# Patient Record
Sex: FEMALE | Race: WHITE | NOT HISPANIC OR LATINO | Employment: FULL TIME | URBAN - METROPOLITAN AREA
[De-identification: names, ages, dates, MRNs, and addresses within clinical notes are randomized per-mention and may not be internally consistent; named-entity substitution may affect disease eponyms.]

---
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
## 2024-04-20 ENCOUNTER — HOSPITAL ENCOUNTER (EMERGENCY)
Facility: HOSPITAL | Age: 19
Discharge: HOME | End: 2024-04-20
Attending: EMERGENCY MEDICINE
Payer: COMMERCIAL

## 2024-04-20 VITALS
DIASTOLIC BLOOD PRESSURE: 77 MMHG | TEMPERATURE: 99 F | HEART RATE: 79 BPM | SYSTOLIC BLOOD PRESSURE: 108 MMHG | WEIGHT: 119 LBS | OXYGEN SATURATION: 99 % | BODY MASS INDEX: 20.43 KG/M2 | RESPIRATION RATE: 18 BRPM

## 2024-04-20 DIAGNOSIS — F10.929 ALCOHOLIC INTOXICATION WITH COMPLICATION: Primary | ICD-10-CM

## 2024-04-20 LAB
B-HCG UR QL: NEGATIVE
CTP QC/QA: YES
POCT GLUCOSE: 197 MG/DL (ref 70–110)

## 2024-04-20 PROCEDURE — 82962 GLUCOSE BLOOD TEST: CPT

## 2024-04-20 PROCEDURE — 99283 EMERGENCY DEPT VISIT LOW MDM: CPT

## 2024-04-20 PROCEDURE — 81025 URINE PREGNANCY TEST: CPT | Performed by: EMERGENCY MEDICINE

## 2024-04-20 RX ORDER — ONDANSETRON 4 MG/1
4 TABLET, ORALLY DISINTEGRATING ORAL EVERY 6 HOURS PRN
Qty: 12 TABLET | Refills: 0 | Status: SHIPPED | OUTPATIENT
Start: 2024-04-20 | End: 2024-04-23

## 2024-04-20 NOTE — DISCHARGE INSTRUCTIONS
Tests today showed:   Labs Reviewed   POCT GLUCOSE - Abnormal; Notable for the following components:       Result Value    POCT Glucose 197 (*)     All other components within normal limits   POCT URINE PREGNANCY   POCT GLUCOSE MONITORING CONTINUOUS     No orders to display       Treatments you had today:   Medications - No data to display    Follow-Up Plan:  - Follow-up with primary care doctor within 3 - 5 days  - Additional testing and/or evaluation as directed by your primary doctor    Return to the Emergency Department for symptoms including but not limited to: worsening symptoms, shortness of breath or chest pain, vomiting with inability to hold down fluids, fevers greater than 100.4°F, passing out/fainting/unconsciousness, or other concerning symptoms.
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
## 2024-04-20 NOTE — Clinical Note
"Loida Ochoa" Arthur was seen and treated in our emergency department on 4/20/2024.  She may return to work on 04/21/2024.       If you have any questions or concerns, please don't hesitate to call.      Loni Coburn MD"

## 2024-04-20 NOTE — ED NOTES
Given 30mL of water, pt. Able to tolerate it, states I am ready to get up and walk, Pt. Ambulated around room and with friend, friend states she will bring her home.

## 2025-02-17 ENCOUNTER — PATIENT MESSAGE (OUTPATIENT)
Dept: OBSTETRICS AND GYNECOLOGY | Facility: CLINIC | Age: 20
End: 2025-02-17

## 2025-02-17 ENCOUNTER — CLINICAL SUPPORT (OUTPATIENT)
Dept: OBSTETRICS AND GYNECOLOGY | Facility: CLINIC | Age: 20
End: 2025-02-17
Payer: COMMERCIAL

## 2025-02-17 DIAGNOSIS — N91.2 ABSENT MENSES: Primary | ICD-10-CM

## 2025-02-17 NOTE — PROGRESS NOTES
Spoke with patient for a total of 23 minutes during virtual visit.  Updated chart to reflect up to date patient demographics.  Allergies, medications, pharmacy, medical/family history and OB history updated.  Patient was guided through expectations of care during pregnancy.  Pregnancy confirmation scheduled for 03/10/25 at 8:00am with Dr. Washington.  Education provided & questions answered. Encouraged to send message or call office with any questions/concerns. Verbalized understanding.     Discussed with pt:    Unsure of LMP due to currently having Nexplanon. This is pt's 2nd Nexplanon-this one in place for 2 years per pt. Multiple positive UPT's.  Encouraged to begin taking a PNV.   C/o nausea, no vomiting reported.  C/o dark brown/black spotting in January, no cramping reported.  Precautions discussed.  Referred to [email or web address] for Preg A to Z guide & class schedule.   Discussed benefits of breastfeeding - plans to breastfeed.   Discussed need for pediatrician. Mentioned [facility] and multiple convenient locations.

## 2025-04-07 ENCOUNTER — TELEPHONE (OUTPATIENT)
Dept: OBSTETRICS AND GYNECOLOGY | Facility: CLINIC | Age: 20
End: 2025-04-07
Payer: COMMERCIAL

## 2025-04-07 ENCOUNTER — OFFICE VISIT (OUTPATIENT)
Dept: OBSTETRICS AND GYNECOLOGY | Facility: CLINIC | Age: 20
End: 2025-04-07
Payer: COMMERCIAL

## 2025-04-07 VITALS
DIASTOLIC BLOOD PRESSURE: 68 MMHG | WEIGHT: 115.94 LBS | BODY MASS INDEX: 20.54 KG/M2 | SYSTOLIC BLOOD PRESSURE: 112 MMHG

## 2025-04-07 DIAGNOSIS — Z30.019 ENCOUNTER FOR FEMALE BIRTH CONTROL: Primary | ICD-10-CM

## 2025-04-07 DIAGNOSIS — Z23 NEED FOR HPV VACCINATION: ICD-10-CM

## 2025-04-07 PROCEDURE — 99202 OFFICE O/P NEW SF 15 MIN: CPT | Mod: 25,S$GLB,, | Performed by: OBSTETRICS & GYNECOLOGY

## 2025-04-07 PROCEDURE — 90651 9VHPV VACCINE 2/3 DOSE IM: CPT | Mod: S$GLB,,, | Performed by: OBSTETRICS & GYNECOLOGY

## 2025-04-07 PROCEDURE — 99999 PR PBB SHADOW E&M-EST. PATIENT-LVL III: CPT | Mod: PBBFAC,,, | Performed by: OBSTETRICS & GYNECOLOGY

## 2025-04-07 PROCEDURE — 90471 IMMUNIZATION ADMIN: CPT | Mod: S$GLB,,, | Performed by: OBSTETRICS & GYNECOLOGY

## 2025-04-07 NOTE — PROGRESS NOTES
Chief Complaint   Patient presents with    nexplanon removal       History of Present Illness   19 y.o. F patient presents today for contraception    Current contraception: Nexplanon for two years.   States she had a positive home pregnancy test, but the negative. She states she takes monthly pregnancy test.   Complains of irregular bleeding.     She is getting paps with another provider, all normal  Desires hpv vaccine    Past medical and surgical history reviewed.   I have reviewed the patient's medical history in detail and updated the computerized patient record.  Review of patient's allergies indicates:   Allergen Reactions    Clindamycin Hives       Review of Systems  Constitutional: negative for chills and fatigue  Gastrointestinal: negative for abdominal pain, constipation, diarrhea, nausea and vomiting  Genitourinary:negative for abnormal menstrual periods, genital lesions and vaginal discharge, dysuria, frequency and hematuria    Physical Examination:  /68 (Patient Position: Sitting)   Wt 52.6 kg (115 lb 15.4 oz)   LMP  (LMP Unknown)   BMI 20.54 kg/m²    Physical Exam  Deferred    Assessment/Plan:  Encounter for female birth control    Need for HPV vaccination  -     hpv vaccine,9-timur (GARDASIL 9) vaccine 0.5 mL    Discussed contraception options, she is considering a Mirena IUD. Keep nexplanon for now.     Start HPV shots today    Annual July    .Your recent lab result is within acceptable range.    Contact us for any questions.    I spent a total of 20 minutes on the day of the visit.This includes face to face time and non-face to face time preparing to see the patient (eg, review of tests), obtaining and/or reviewing separately obtained history, documenting clinical information in the electronic or other health record, independently interpreting results and communicating results to the patient/family/caregiver, or care coordinator.

## 2025-04-07 NOTE — TELEPHONE ENCOUNTER
----- Message from Galinalianeclementina sent at 4/7/2025  8:14 AM CDT -----  Type: Needs Medical AdviceWho Called:  pt's Jaimie Call Back Number: [phone] Information: pt's mother is calling the office to speak with the nurse and is asking if the pt needed to abstain from sex 7 days prior to getting her Nexplanon removed. If this is the case she needs to have her appt rescheduled. Please call back to advise. Thanks!

## 2025-04-07 NOTE — TELEPHONE ENCOUNTER
----- Message from Leanne sent at 4/7/2025  8:48 AM CDT -----  Contact: Loida  Type:  Needs Medical AdviceWho Called: [facility] (please be specific): She has a medical question for the nurse before to drive to the appointment with  [facility] Number: [phone]!

## 2025-06-09 ENCOUNTER — CLINICAL SUPPORT (OUTPATIENT)
Dept: OBSTETRICS AND GYNECOLOGY | Facility: CLINIC | Age: 20
End: 2025-06-09
Payer: COMMERCIAL

## 2025-06-09 DIAGNOSIS — Z23 NEED FOR HPV VACCINATION: Primary | ICD-10-CM

## 2025-06-09 PROCEDURE — 90651 9VHPV VACCINE 2/3 DOSE IM: CPT | Mod: S$GLB,,, | Performed by: OBSTETRICS & GYNECOLOGY

## 2025-06-09 PROCEDURE — 90471 IMMUNIZATION ADMIN: CPT | Mod: S$GLB,,, | Performed by: OBSTETRICS & GYNECOLOGY

## 2025-06-09 PROCEDURE — 99999 PR PBB SHADOW E&M-EST. PATIENT-LVL I: CPT | Mod: PBBFAC,,,

## 2025-06-09 NOTE — PROGRESS NOTES
PT came on time for nurse injection for HPV, pt tolerated well and scheduled to come in for next injection.